# Patient Record
Sex: MALE | Race: BLACK OR AFRICAN AMERICAN | NOT HISPANIC OR LATINO | URBAN - METROPOLITAN AREA
[De-identification: names, ages, dates, MRNs, and addresses within clinical notes are randomized per-mention and may not be internally consistent; named-entity substitution may affect disease eponyms.]

---

## 2018-09-03 ENCOUNTER — EMERGENCY (EMERGENCY)
Facility: HOSPITAL | Age: 58
LOS: 1 days | Discharge: ANOTHER HOSPITAL | End: 2018-09-03
Attending: EMERGENCY MEDICINE | Admitting: EMERGENCY MEDICINE
Payer: SELF-PAY

## 2018-09-03 VITALS
HEART RATE: 92 BPM | OXYGEN SATURATION: 97 % | SYSTOLIC BLOOD PRESSURE: 112 MMHG | RESPIRATION RATE: 18 BRPM | DIASTOLIC BLOOD PRESSURE: 73 MMHG | WEIGHT: 175.05 LBS | TEMPERATURE: 98 F

## 2018-09-03 VITALS
HEART RATE: 85 BPM | RESPIRATION RATE: 20 BRPM | DIASTOLIC BLOOD PRESSURE: 78 MMHG | OXYGEN SATURATION: 97 % | SYSTOLIC BLOOD PRESSURE: 139 MMHG

## 2018-09-03 LAB
ALBUMIN SERPL ELPH-MCNC: 3.6 G/DL — SIGNIFICANT CHANGE UP (ref 3.4–5)
ALP SERPL-CCNC: 56 U/L — SIGNIFICANT CHANGE UP (ref 40–120)
ALT FLD-CCNC: 22 U/L — SIGNIFICANT CHANGE UP (ref 12–42)
ANION GAP SERPL CALC-SCNC: 9 MMOL/L — SIGNIFICANT CHANGE UP (ref 9–16)
AST SERPL-CCNC: 29 U/L — SIGNIFICANT CHANGE UP (ref 15–37)
BILIRUB SERPL-MCNC: 0.8 MG/DL — SIGNIFICANT CHANGE UP (ref 0.2–1.2)
BUN SERPL-MCNC: 6 MG/DL — LOW (ref 7–23)
CALCIUM SERPL-MCNC: 8.7 MG/DL — SIGNIFICANT CHANGE UP (ref 8.5–10.5)
CHLORIDE SERPL-SCNC: 106 MMOL/L — SIGNIFICANT CHANGE UP (ref 96–108)
CO2 SERPL-SCNC: 22 MMOL/L — SIGNIFICANT CHANGE UP (ref 22–31)
CREAT SERPL-MCNC: 0.9 MG/DL — SIGNIFICANT CHANGE UP (ref 0.5–1.3)
ETHANOL SERPL-MCNC: <3 MG/DL — SIGNIFICANT CHANGE UP
GLUCOSE SERPL-MCNC: 110 MG/DL — HIGH (ref 70–99)
HCT VFR BLD CALC: 45.2 % — SIGNIFICANT CHANGE UP (ref 39–50)
HGB BLD-MCNC: 15 G/DL — SIGNIFICANT CHANGE UP (ref 13–17)
MCHC RBC-ENTMCNC: 32.3 PG — SIGNIFICANT CHANGE UP (ref 27–34)
MCHC RBC-ENTMCNC: 33.2 G/DL — SIGNIFICANT CHANGE UP (ref 32–36)
MCV RBC AUTO: 97.4 FL — SIGNIFICANT CHANGE UP (ref 80–100)
PLATELET # BLD AUTO: 164 K/UL — SIGNIFICANT CHANGE UP (ref 150–400)
POTASSIUM SERPL-MCNC: 4.6 MMOL/L — SIGNIFICANT CHANGE UP (ref 3.5–5.3)
POTASSIUM SERPL-SCNC: 4.6 MMOL/L — SIGNIFICANT CHANGE UP (ref 3.5–5.3)
PROT SERPL-MCNC: 7.9 G/DL — SIGNIFICANT CHANGE UP (ref 6.4–8.2)
RBC # BLD: 4.64 M/UL — SIGNIFICANT CHANGE UP (ref 4.2–5.8)
RBC # FLD: 13 % — SIGNIFICANT CHANGE UP (ref 10.3–16.9)
SODIUM SERPL-SCNC: 137 MMOL/L — SIGNIFICANT CHANGE UP (ref 132–145)
TROPONIN I SERPL-MCNC: <0.017 NG/ML — LOW (ref 0.02–0.06)
WBC # BLD: 5.6 K/UL — SIGNIFICANT CHANGE UP (ref 3.8–10.5)
WBC # FLD AUTO: 5.6 K/UL — SIGNIFICANT CHANGE UP (ref 3.8–10.5)

## 2018-09-03 PROCEDURE — 99291 CRITICAL CARE FIRST HOUR: CPT

## 2018-09-03 PROCEDURE — 71045 X-RAY EXAM CHEST 1 VIEW: CPT | Mod: 26

## 2018-09-03 PROCEDURE — 70450 CT HEAD/BRAIN W/O DYE: CPT | Mod: 26

## 2018-09-03 PROCEDURE — 93010 ELECTROCARDIOGRAM REPORT: CPT

## 2018-09-03 PROCEDURE — 72125 CT NECK SPINE W/O DYE: CPT | Mod: 26

## 2018-09-03 PROCEDURE — 70480 CT ORBIT/EAR/FOSSA W/O DYE: CPT | Mod: 26,59

## 2018-09-03 RX ORDER — SODIUM CHLORIDE 9 MG/ML
3 INJECTION INTRAMUSCULAR; INTRAVENOUS; SUBCUTANEOUS ONCE
Qty: 0 | Refills: 0 | Status: COMPLETED | OUTPATIENT
Start: 2018-09-03 | End: 2018-09-03

## 2018-09-03 RX ADMIN — SODIUM CHLORIDE 3 MILLILITER(S): 9 INJECTION INTRAMUSCULAR; INTRAVENOUS; SUBCUTANEOUS at 13:37

## 2018-09-03 NOTE — ED ADULT NURSE NOTE - CHIEF COMPLAINT QUOTE
here for witnessed fall 12:30 with head trauma + hematoma- arrives with neck collar, AMS- no other signs of injury or trauma - pt not following simple commands- FS in the field 108- blood noted to right ear

## 2018-09-03 NOTE — ED ADULT NURSE NOTE - NSIMPLEMENTINTERV_GEN_ALL_ED
Implemented All Fall Risk Interventions:  [place] to call system. Call bell, personal items and telephone within reach. Instruct patient to call for assistance. Room bathroom lighting operational. Non-slip footwear when patient is off stretcher. Physically safe environment: no spills, clutter or unnecessary equipment. Stretcher in lowest position, wheels locked, appropriate side rails in place. Provide visual cue, wrist band, yellow gown, etc. Monitor gait and stability. Monitor for mental status changes and reorient to person, place, and time. Review medications for side effects contributing to fall risk. Reinforce activity limits and safety measures with patient and family.

## 2018-09-03 NOTE — ED PROVIDER NOTE - ENMT, MLM
Airway patent, Nasal mucosa clear.  Bleeding from R ear - cannot visualize TM due to blood in external auditory canal

## 2018-09-03 NOTE — ED PROVIDER NOTE - DIAGNOSTIC INTERPRETATION
CT head shows right subdural hematoma and right displaced temporal bone fracture.   Chest Xray   C spine ER Physician: Jacky Anguiano  CHEST XRAY INTERPRETATION: lungs clear, heart shadow normal, bony structures intact

## 2018-09-03 NOTE — ED PROVIDER NOTE - CARE PLAN
Principal Discharge DX:	Closed fracture of temporal bone, initial encounter  Secondary Diagnosis:	SDH (subdural hematoma)

## 2018-09-03 NOTE — ED PROVIDER NOTE - MEDICAL DECISION MAKING DETAILS
CT head shows right subdural hematoma and right displaced temporal bone fracture.  Will transfer to [place] CT head shows right subdural hematoma and right displaced temporal bone fracture.   GCS remains 12>  Protecting his airway.  PERRL.  Stable for transfer to trauma center [facility] for fall with head injury.  On Arrival GCS 13.  No posturing, PERRL.  CT head shows right subdural hematoma and right displaced temporal bone fracture + R hemotympanum.   GCS remains 13, Protecting his airway.  Reviewed labs, ekg.  D/w Dr. Ballard from [place] who accepts transfer for evaluation and management.

## 2018-09-03 NOTE — ED ADULT TRIAGE NOTE - CHIEF COMPLAINT QUOTE
here for witnessed fall 12:30 with head trauma + hematoma- arrives with neck collar, AMS- no other signs of injury or trauma - pt not following simple commands- FS in the field 108 here for witnessed fall 12:30 with head trauma + hematoma- arrives with neck collar, AMS- no other signs of injury or trauma - pt not following simple commands- FS in the field 108- blood noted to right ear

## 2018-09-03 NOTE — ED PROVIDER NOTE - OBJECTIVE STATEMENT
76 y old male brought in by EMS following an unwitnessed fall from standing height.  Pt was found on the ground face up in a small pool of blood with right hemotympanum.  No ID found on patient. Awake and alert with no LOC but disoriented. No active seizure or facial droop seen. No vomiting. 76 y old male brought in by EMS following an unwitnessed fall from standing height.  Pt was found on the ground face up in a small pool of blood with right hemotympanum.  No ID found on patient. Awake with no LOC but is disoriented with an AMS. No active seizure or facial droop seen.  No vomiting. 76 y old male brought in by EMS following an unwitnessed fall.  Bystanders state that he feel from from standing height.  Pt was found on the ground face up in a small pool of blood with right hemotympanum.  No ID found on patient. Awake with no LOC but is disoriented with an AMS. No active seizure or facial droop seen.  No vomiting.  GCS of 13 on arrival. 76 y old male brought in by EMS following an unwitnessed fall.  Bystanders state that he fell from standing height.  Pt was found on the ground face up in a small pool of blood with right hemotympanum.  No ID found on patient. Awake with no LOC but is disoriented with an AMS. No active seizure or facial droop seen.  No vomiting.  GCS of 13 on arrival. male brought in by EMS following an unwitnessed fall.  Bystanders state that he fell from standing height.  Pt was found on the ground face up in a small pool of blood.  No ID found on patient.   As per EMS no vomiting or change in mental status in the field.  History and ROS limited

## 2018-09-03 NOTE — ED PROVIDER NOTE - CRITICAL CARE PROVIDED
interpretation of diagnostic studies/direct patient care (not related to procedure) interpretation of diagnostic studies/direct patient care (not related to procedure)/consultation with other physicians

## 2018-09-07 DIAGNOSIS — S02.19XA OTHER FRACTURE OF BASE OF SKULL, INITIAL ENCOUNTER FOR CLOSED FRACTURE: ICD-10-CM

## 2018-09-07 DIAGNOSIS — R46.0 VERY LOW LEVEL OF PERSONAL HYGIENE: ICD-10-CM
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
## 2018-09-07 DIAGNOSIS — S06.5X0A TRAUMATIC SUBDURAL HEMORRHAGE WITHOUT LOSS OF CONSCIOUSNESS, INITIAL ENCOUNTER: ICD-10-CM

## 2018-09-07 DIAGNOSIS — S09.93XA UNSPECIFIED INJURY OF FACE, INITIAL ENCOUNTER: ICD-10-CM
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
